# Patient Record
(demographics unavailable — no encounter records)

---

## 2025-02-19 NOTE — HISTORY OF PRESENT ILLNESS
[Pain Location] : pain [T-Spine] : T-spine region [2] : a minimum pain level of 2/10 [3] : a maximum pain level of 3/10 [de-identified] :  A 51-Year-old female is here today for an initial visit for upper back pain that began 2 weeks ago.  Patient was referred to us by his PCP. Patient states that he works in [employer]. He states that the pain has decreased over the last week. He notes when he stands for prolonged amount time her notes some lower back pain. Patient denies a history of injuries, accident and falls. He denies changes in health and symptoms in the lower extremity.

## 2025-02-19 NOTE — ADDENDUM
[FreeTextEntry1] :  I, Patricia Laurad, acted solely as a scribe for Dr. Emmett Dockery on this date 02/19/2025 .  All medical records entries made by the Scribe were at my Dr. Emmett Dockery direction and personally dictated by me on 02/19/2025. I have reviewed the chart and agree that the record accurately reflects my personal performance of the history, physical exam, assessment and plan. I have also personally directed, reviewed, and agreed with the chart.

## 2025-02-19 NOTE — DISCUSSION/SUMMARY
[de-identified] :  I have discussed the underlying pathophysiology of the patient's condition.  Activity modifications were reviewed with the patient at length.  The patient is a , and the importance of office ergonomics was underscored with the patient.  I have recommended patient take Aleve as needed. The patient should follow up with me as needed.

## 2025-02-19 NOTE — PHYSICAL EXAM
[UE/LE] : Sensory: Intact in bilateral upper & lower extremities [Normal] : Oriented to person, place, and time, insight and judgement were intact and the affect was normal [de-identified] : There is mild tenderness palpation in of the left-sided paravertebral muscular area along the insertion point of the trapezius at the level of T5 or T6.  No obvious deformities noted.  No trapezial tenderness is noted in the muscle belly. [de-identified] :  AP & lateral Flexion and Extension X-Rays of the Thoracic spine taken 02/19/2025 shows mild degenerative changes without tumor infection fracture or gross instability.